# Patient Record
(demographics unavailable — no encounter records)

---

## 2025-05-14 NOTE — CARDIOLOGY SUMMARY
[de-identified] : 5/14/2025, NSR, normal ECG 7/2/2024, NSR, normal ECG 5/31/2024, NSR, normal ECG 6/15/2023, Sinus Tachycardia [de-identified] : 9/12/2018,Plain Treadmill Stress Testing:  Exercised for 9 minutes utlizing standard Mitul Protocol. Achieved 85% MPHR and 10 METs. There was no chest pain or ECG changes to suggest ischemia. Excellent Functional capacity. \par   [de-identified] : 7/2/2024, LV EF 60-65%, mild MR, mild TR 8/22/2018, LV diastolic dysfunction with indeterminate filling pressures, Mild concentric LVH, MV leaflets are mildy thickened. There is Mild MR, Mild TR, PASP of at least 30mmHg. LVEF 55-60%.     [de-identified] : 6/27/2024, Carotid Duplex: mild carotid artery disease. 8/22/2018, Carotid Duplex: mild carotid artery disease.

## 2025-05-14 NOTE — DISCUSSION/SUMMARY
[FreeTextEntry1] : 1. Hx of Lacunar Infarts: Goal BP less than 130/80. Continue Aspirin 81mg daily, rosuvastatin 10mg nighty, Zetia 10mg nightly. Goal LDL less than 70. Patient underwent Duplex US of the carotid arteries in my office on 6/27/2024, which demonstrated mild atherosclerosis with no significant obstructive disease.  2. History of syncope: likely vasovagal. No recurrence. patient underwent Duplex US of the carotid arteries in my office on 6/27/2024, which demonstrated mild atherosclerosis with no significant obstructive disease.  3. Mitral regurgitation: mildly thickened MV leaflets with mild regurgitation on echocardiogram performed in my office. Continue to follow periodically with echocardiography.  4. HLD: continue rosuvastatin 10mg daily. Goal LDL less than 70.  5. HTN: Goal BP less than 130/80. Recommend low salt diet. Continue losartan/HCTZ 50/12.5mg daily.  6. Carotid Atherosclerosis: carotid duplex, 6/27/2024, revealed mild disease. Recommend periodic carotid duplex. Continue rosuvastatin 10mg nightly.  Follow up in 12 months. [EKG obtained to assist in diagnosis and management of assessed problem(s)] : EKG obtained to assist in diagnosis and management of assessed problem(s)
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
## 2025-05-14 NOTE — PHYSICAL EXAM
[Normal] : moves all extremities, no focal deficits, normal speech [de-identified] : No carotid bruits auscultated bilaterally